# Patient Record
Sex: MALE | Race: BLACK OR AFRICAN AMERICAN | NOT HISPANIC OR LATINO | Employment: OTHER | URBAN - METROPOLITAN AREA
[De-identification: names, ages, dates, MRNs, and addresses within clinical notes are randomized per-mention and may not be internally consistent; named-entity substitution may affect disease eponyms.]

---

## 2017-11-16 ENCOUNTER — LAB VISIT (OUTPATIENT)
Dept: LAB | Facility: HOSPITAL | Age: 62
End: 2017-11-16
Attending: INTERNAL MEDICINE
Payer: MEDICARE

## 2017-11-16 DIAGNOSIS — E11.29 TYPE II DIABETES MELLITUS WITH RENAL MANIFESTATIONS: ICD-10-CM

## 2017-11-16 DIAGNOSIS — Z12.2 SCREENING FOR MALIGNANT NEOPLASM OF RESPIRATORY ORGAN: Primary | ICD-10-CM

## 2017-11-16 LAB
ALBUMIN SERPL BCP-MCNC: 4.3 G/DL
ALP SERPL-CCNC: 75 U/L
ALT SERPL W/O P-5'-P-CCNC: 205 U/L
ANION GAP SERPL CALC-SCNC: 11 MMOL/L
AST SERPL-CCNC: 400 U/L
BACTERIA #/AREA URNS AUTO: ABNORMAL /HPF
BASOPHILS # BLD AUTO: 0.02 K/UL
BASOPHILS NFR BLD: 0.3 %
BILIRUB SERPL-MCNC: 0.8 MG/DL
BILIRUB UR QL STRIP: NEGATIVE
BUN SERPL-MCNC: 12 MG/DL
CALCIUM SERPL-MCNC: 9.3 MG/DL
CHLORIDE SERPL-SCNC: 99 MMOL/L
CHOLEST SERPL-MCNC: 163 MG/DL
CHOLEST/HDLC SERPL: 2 {RATIO}
CLARITY UR REFRACT.AUTO: CLEAR
CO2 SERPL-SCNC: 28 MMOL/L
COLOR UR AUTO: ABNORMAL
CREAT SERPL-MCNC: 1.03 MG/DL
DIFFERENTIAL METHOD: ABNORMAL
EOSINOPHIL # BLD AUTO: 0.2 K/UL
EOSINOPHIL NFR BLD: 2.7 %
ERYTHROCYTE [DISTWIDTH] IN BLOOD BY AUTOMATED COUNT: 14.9 %
EST. GFR  (AFRICAN AMERICAN): >60 ML/MIN/1.73 M^2
EST. GFR  (NON AFRICAN AMERICAN): >60 ML/MIN/1.73 M^2
GLUCOSE SERPL-MCNC: 67 MG/DL
GLUCOSE UR QL STRIP: NEGATIVE
HCT VFR BLD AUTO: 42.7 %
HDLC SERPL-MCNC: 83 MG/DL
HDLC SERPL: 50.9 %
HGB BLD-MCNC: 14.5 G/DL
HGB UR QL STRIP: ABNORMAL
HYALINE CASTS UR QL AUTO: 0 /LPF
KETONES UR QL STRIP: NEGATIVE
LDLC SERPL CALC-MCNC: 66.8 MG/DL
LEUKOCYTE ESTERASE UR QL STRIP: ABNORMAL
LYMPHOCYTES # BLD AUTO: 3.3 K/UL
LYMPHOCYTES NFR BLD: 48 %
MCH RBC QN AUTO: 32 PG
MCHC RBC AUTO-ENTMCNC: 34 G/DL
MCV RBC AUTO: 94 FL
MICROSCOPIC COMMENT: ABNORMAL
MONOCYTES # BLD AUTO: 1.3 K/UL
MONOCYTES NFR BLD: 18.4 %
NEUTROPHILS # BLD AUTO: 2.1 K/UL
NEUTROPHILS NFR BLD: 30.5 %
NITRITE UR QL STRIP: NEGATIVE
NONHDLC SERPL-MCNC: 80 MG/DL
PH UR STRIP: 6 [PH] (ref 5–8)
PLATELET # BLD AUTO: 178 K/UL
PMV BLD AUTO: 10 FL
POTASSIUM SERPL-SCNC: 4.3 MMOL/L
PROT SERPL-MCNC: 7.7 G/DL
PROT UR QL STRIP: ABNORMAL
RBC # BLD AUTO: 4.53 M/UL
RBC #/AREA URNS AUTO: 2 /HPF (ref 0–4)
SODIUM SERPL-SCNC: 138 MMOL/L
SP GR UR STRIP: 1.02 (ref 1–1.03)
TRIGL SERPL-MCNC: 66 MG/DL
TSH SERPL DL<=0.005 MIU/L-ACNC: 1.77 UIU/ML
URN SPEC COLLECT METH UR: ABNORMAL
UROBILINOGEN UR STRIP-ACNC: 1 EU/DL
WBC # BLD AUTO: 6.96 K/UL
WBC #/AREA URNS AUTO: 20 /HPF (ref 0–5)

## 2017-11-16 PROCEDURE — 81000 URINALYSIS NONAUTO W/SCOPE: CPT | Mod: PO

## 2017-11-16 PROCEDURE — 85025 COMPLETE CBC W/AUTO DIFF WBC: CPT | Mod: PO

## 2017-11-16 PROCEDURE — 36415 COLL VENOUS BLD VENIPUNCTURE: CPT | Mod: PO

## 2017-11-16 PROCEDURE — 80053 COMPREHEN METABOLIC PANEL: CPT | Mod: PO

## 2017-11-16 PROCEDURE — 84443 ASSAY THYROID STIM HORMONE: CPT | Mod: PO

## 2017-11-16 PROCEDURE — 80061 LIPID PANEL: CPT

## 2017-11-16 PROCEDURE — 84153 ASSAY OF PSA TOTAL: CPT | Mod: PO

## 2017-11-17 ENCOUNTER — LAB VISIT (OUTPATIENT)
Dept: LAB | Facility: HOSPITAL | Age: 62
End: 2017-11-17
Attending: INTERNAL MEDICINE
Payer: MEDICARE

## 2017-11-17 DIAGNOSIS — Z12.2 SCREENING FOR MALIGNANT NEOPLASM OF RESPIRATORY ORGAN: Primary | ICD-10-CM

## 2017-11-17 LAB
OB PNL STL: NEGATIVE
PROSTATE SPECIFIC ANTIGEN, TOTAL: 66.6 NG/ML
PSA FREE MFR SERPL: 4.04 %
PSA FREE SERPL-MCNC: 2.69 NG/ML

## 2017-11-17 PROCEDURE — 82272 OCCULT BLD FECES 1-3 TESTS: CPT | Mod: PO

## 2017-11-22 ENCOUNTER — LAB VISIT (OUTPATIENT)
Dept: LAB | Facility: HOSPITAL | Age: 62
End: 2017-11-22
Attending: INTERNAL MEDICINE
Payer: MEDICARE

## 2017-11-22 DIAGNOSIS — R74.8 ACID PHOSPHATASE ELEVATED: Primary | ICD-10-CM

## 2017-11-22 LAB
AFP SERPL-MCNC: 1.9 NG/ML
GGT SERPL-CCNC: 83 U/L
HCV AB SERPL QL IA: NEGATIVE

## 2017-11-22 PROCEDURE — 82105 ALPHA-FETOPROTEIN SERUM: CPT | Mod: PO

## 2017-11-22 PROCEDURE — 86803 HEPATITIS C AB TEST: CPT | Mod: PO

## 2017-11-22 PROCEDURE — 82977 ASSAY OF GGT: CPT

## 2017-11-22 PROCEDURE — 36415 COLL VENOUS BLD VENIPUNCTURE: CPT | Mod: PO

## 2017-11-27 DIAGNOSIS — K70.10 ACUTE ALCOHOLIC HEPATITIS: Primary | ICD-10-CM

## 2017-12-01 ENCOUNTER — HOSPITAL ENCOUNTER (OUTPATIENT)
Dept: RADIOLOGY | Facility: HOSPITAL | Age: 62
Discharge: HOME | End: 2017-12-01
Attending: INTERNAL MEDICINE
Payer: MEDICARE

## 2017-12-01 DIAGNOSIS — K70.10 ACUTE ALCOHOLIC HEPATITIS: ICD-10-CM

## 2017-12-01 PROCEDURE — 76700 US EXAM ABDOM COMPLETE: CPT | Mod: TC,PO

## 2018-02-08 ENCOUNTER — OFFICE VISIT (OUTPATIENT)
Dept: GASTROENTEROLOGY | Facility: CLINIC | Age: 63
End: 2018-02-08
Payer: MEDICARE

## 2018-02-08 VITALS
SYSTOLIC BLOOD PRESSURE: 161 MMHG | BODY MASS INDEX: 21.37 KG/M2 | DIASTOLIC BLOOD PRESSURE: 97 MMHG | WEIGHT: 136.19 LBS | HEART RATE: 100 BPM | HEIGHT: 67 IN

## 2018-02-08 DIAGNOSIS — Z12.11 COLON CANCER SCREENING: ICD-10-CM

## 2018-02-08 DIAGNOSIS — R79.89 LFTS ABNORMAL: Primary | ICD-10-CM

## 2018-02-08 PROCEDURE — 99204 OFFICE O/P NEW MOD 45 MIN: CPT | Mod: S$GLB,,, | Performed by: INTERNAL MEDICINE

## 2018-02-08 PROCEDURE — 3008F BODY MASS INDEX DOCD: CPT | Mod: S$GLB,,, | Performed by: INTERNAL MEDICINE

## 2018-02-08 PROCEDURE — 99999 PR PBB SHADOW E&M-EST. PATIENT-LVL III: CPT | Mod: PBBFAC,,, | Performed by: INTERNAL MEDICINE

## 2018-02-08 NOTE — PROGRESS NOTES
Subjective:      Patient ID: Andrew Patrick is a 62 y.o. male.    Chief Complaint: Anorexia; Colonoscopy; and Elevated ALT    HPI:   Patient 62-year-old male referred with a history of abnormal liver enzymes.  Also due for colon screening.  Available labs documenting a is T of 400 and ALT of 205 in November 2017  Bilirubin and alkaline phosphatase were normal.  GGT was 83.  Hepatitis C antibody negative.  Patient admits to daily beer consumption.  May be minimizing quantity.  He is accompanied by his spouse who is fairly quiet on this point  GI systems review otherwise negative.  Past medical history includes hypertension.  Smokes cigarettes.  Family history negative for GI neoplasm.    Review of patient's allergies indicates:  No Known Allergies  History reviewed. No pertinent past medical history.  History reviewed. No pertinent surgical history.  Family History   Problem Relation Age of Onset    Diabetes Father      Social History     Social History    Marital status: Single     Spouse name: N/A    Number of children: N/A    Years of education: N/A     Occupational History    Not on file.     Social History Main Topics    Smoking status: Current Every Day Smoker    Smokeless tobacco: Current User    Alcohol use Yes    Drug use: Unknown    Sexual activity: Not on file     Other Topics Concern    Not on file     Social History Narrative    No narrative on file       Review of Systems:  Constitutional: Negative for appetite change.  Some weight loss  HENT: Negative for trouble swallowing.   Eyes: Negative for photophobia.   Respiratory: Negative for cough and shortness of breath.   Cardiovascular: Negative for palpitations.   Gastrointestinal: See HPI for details.  Genitourinary: Negative for frequency and hematuria.   Skin: Negative for rash.   Musculoskeletal: Joint pains.  Neurological: Negative for weakness and headaches.   Hematological: Negative.   Psychiatric/Behavioral: Negative for suicidal  "ideas and behavioral problems.     Objective:     BP (!) 161/97 (BP Location: Left arm, Patient Position: Sitting)   Pulse 100   Ht 5' 7" (1.702 m)   Wt 61.8 kg (136 lb 3.2 oz)   BMI 21.33 kg/m²     Physical Exam:  Eyes: Pupils are equal, round, and reactive to light.   Neck: Supple. No mass  Cardiovascular: Regular rhythm . No murmur   Pulmonary/Chest: Lungs clear   Abdominal: Soft. No mass palpated. Nontender, no guarding. Positive bowel sounds   Musculoskeletal: No deformity. No edema.   Psychiatric: Alert and oriented    Assessment:     1. LFTs abnormal    2. Colon cancer screening      Plan:     Andrew was seen today for anorexia, colonoscopy and elevated alt.    Diagnoses and all orders for this visit:    LFTs abnormal  -     Hepatitis B surface antigen; Future  -     Hepatitis B surface antibody; Future  -     Hepatic function panel; Future  -     Iron and TIBC; Future  -     Ferritin; Future    Colon cancer screening  -     Case request GI: COLONOSCOPY      Plan:  Recheck liver enzymes  Complete the viral hepatitis screen  Screening colonoscopy.  Advised patient to limit his alcohol intake.    CC Dr. doss    "

## 2018-02-08 NOTE — PATIENT INSTRUCTIONS
"  ALT  Does this test have other names?  Alanine aminotransferase, serum glutamic-pyruvic transaminase, SGPT  What is this test?  This test measures the amount of the enzyme alanine aminotransferase (ALT) in your blood.  ALT, formerly called SGPT, is mostly found in your liver cells. When liver cells are injured, they release this enzyme into your blood. High levels are a sign of liver damage.  This test is part of a group of tests commonly referred to as "liver function tests." Results of these tests give health care providers an overall picture of how well your liver is working.  Why do I need this test?  You may have this test to see if you have a liver disease, such as hepatitis. Symptoms of liver diseases include:  · Extreme tiredness  · Yellowing of the eyes and skin, a condition called jaundice  · Abdominal (belly) pain  · Nausea and vomiting  · Diarrhea  · Headache  You may also have this test to look for cirrhosis, which causes damage and scarring to the liver. Causes of cirrhosis include long-term hepatitis infection, excessive alcohol use, obesity, and exposure to certain medications or toxins. Symptoms of cirrhosis include:  · Abdominal swelling from fluid buildup  · Visible blood vessels in the skin  · Itchy skin  · Swelling of the legs, feet or ankles  · Nausea, loss of appetite  · Weight loss  · Fatigue or feeling tired  What other tests might I have along with this test?  Your health care provider may also order other tests of liver health, including:  · Albumin  · Bilirubin  · Alkaline phosphatase,  or  ALP  · Aspartate aminotransferase, or AST  · Prothrombin time, or  PT  Your health care provider may also order other tests that measure:  · Your liver's ability to process substances from your blood  · Levels of substances your liver produces  · Liver inflammation  What do my test results mean?  Many things may affect your lab test results. These include the method each lab uses to do the test. Even " if your test results are different from the normal value, you may not have a problem. To learn what the results mean for you, talk with your health care provider.  ALT levels are normally less than 40 international units per liter (IU/L). Levels above 1,000 IU/L may be a sign of:  · Acute viral hepatitis  · Lack of blood flow to the liver  · Injuries from drugs or toxins  The ratio of AST to ALT may also provide helpful information to your health care provider. AST levels are normally lower than ALT levels. AST is often higher than ALT in cases such as:  · Hepatitis from alcohol use  · Cirrhosis in people with long-term viral hepatitis  A number of other medical conditions besides liver disease can also cause liver enzymes to rise. These include:  · Muscle diseases  · Celiac disease  · Thyroid problems  · Adrenal gland problems  How is this test done?  The test requires a blood sample, which is drawn through a needle from a vein in your arm.  Does this test pose any risks?  Taking a blood sample with a needle carries risks that include bleeding, infection, bruising, or feeling dizzy. When the needle pricks your arm, you may feel a slight stinging sensation or pain. Afterward, the site may be slightly sore.  What might affect my test results?  Many medications can affect your test results, as can drinking alcohol.  How do I get ready for this test?  Your health care provider may ask you to not to eat or drink and avoid medications before your blood tests. Be sure your health care providers knows about all medicines, herbs, vitamins, and supplements you are taking. This includes medicines that don't need a prescription and any illicit drugs you may use.  © 4302-1431 [employer]. [address] [place] [address], [place] [address]. All rights reserved. This information is not intended as a substitute for professional medical care. Always follow your healthcare professional's instructions.

## 2018-02-09 ENCOUNTER — LAB VISIT (OUTPATIENT)
Dept: LAB | Facility: HOSPITAL | Age: 63
End: 2018-02-09
Attending: INTERNAL MEDICINE
Payer: MEDICARE

## 2018-02-09 DIAGNOSIS — R79.89 LFTS ABNORMAL: ICD-10-CM

## 2018-02-09 LAB
ALBUMIN SERPL BCP-MCNC: 4.4 G/DL
ALP SERPL-CCNC: 84 U/L
ALT SERPL W/O P-5'-P-CCNC: 20 U/L
AST SERPL-CCNC: 48 U/L
BILIRUB SERPL-MCNC: 0.4 MG/DL
FERRITIN SERPL-MCNC: 330 NG/ML
IRON SERPL-MCNC: 75 UG/DL
PROT SERPL-MCNC: 7.9 G/DL
SATURATED IRON: 22 %
TOTAL IRON BINDING CAPACITY: 346 UG/DL
TRANSFERRIN SERPL-MCNC: 234 MG/DL

## 2018-02-09 PROCEDURE — 86706 HEP B SURFACE ANTIBODY: CPT | Mod: PO

## 2018-02-09 PROCEDURE — 80076 HEPATIC FUNCTION PANEL: CPT | Mod: PO

## 2018-02-09 PROCEDURE — 83540 ASSAY OF IRON: CPT | Mod: PO

## 2018-02-09 PROCEDURE — 87340 HEPATITIS B SURFACE AG IA: CPT | Mod: PO

## 2018-02-09 PROCEDURE — 82728 ASSAY OF FERRITIN: CPT

## 2018-02-09 PROCEDURE — 36415 COLL VENOUS BLD VENIPUNCTURE: CPT | Mod: PO

## 2018-02-12 LAB
HBV SURFACE AB SER-ACNC: NEGATIVE M[IU]/ML
HBV SURFACE AG SERPL QL IA: NEGATIVE

## 2018-02-16 ENCOUNTER — TELEPHONE (OUTPATIENT)
Dept: GASTROENTEROLOGY | Facility: CLINIC | Age: 63
End: 2018-02-16

## 2018-02-16 NOTE — TELEPHONE ENCOUNTER
----- Message from Alma Rosa Patrick sent at 2/16/2018 11:00 AM CST -----  Contact: self/[phone]  Patient is returning your call.  Please call [phone] and advise.

## 2018-02-16 NOTE — TELEPHONE ENCOUNTER
----- Message from Luis Antonio Chavez Jr., MD sent at 2/15/2018  1:37 AM CST -----  Lab work shows improvement in the liver (probably due to his limiting his beer drinking)  Also tests negative for hepatitis B   Notify patient.

## 2018-03-09 ENCOUNTER — ANESTHESIA EVENT (OUTPATIENT)
Dept: ENDOSCOPY | Facility: HOSPITAL | Age: 63
End: 2018-03-09

## 2018-03-09 ENCOUNTER — ANESTHESIA (OUTPATIENT)
Dept: ENDOSCOPY | Facility: HOSPITAL | Age: 63
End: 2018-03-09

## 2021-04-26 ENCOUNTER — OFFICE VISIT (OUTPATIENT)
Dept: ENDOCRINOLOGY | Facility: CLINIC | Age: 66
End: 2021-04-26

## 2021-04-26 VITALS
BODY MASS INDEX: 24.48 KG/M2 | SYSTOLIC BLOOD PRESSURE: 100 MMHG | HEIGHT: 67 IN | TEMPERATURE: 98 F | DIASTOLIC BLOOD PRESSURE: 58 MMHG | OXYGEN SATURATION: 95 % | HEART RATE: 70 BPM | WEIGHT: 156 LBS

## 2021-04-26 DIAGNOSIS — E83.52 HYPERCALCEMIA: Primary | ICD-10-CM

## 2021-04-26 PROCEDURE — 99203 OFFICE O/P NEW LOW 30 MIN: CPT | Performed by: INTERNAL MEDICINE

## 2021-04-26 RX ORDER — CETIRIZINE HYDROCHLORIDE 10 MG/1
TABLET ORAL
COMMUNITY
Start: 2021-04-08

## 2021-04-26 RX ORDER — MELOXICAM 15 MG/1
15 TABLET ORAL DAILY
COMMUNITY
Start: 2021-04-07

## 2021-04-26 RX ORDER — CILOSTAZOL 100 MG/1
TABLET ORAL
COMMUNITY
Start: 2021-04-13

## 2021-04-26 RX ORDER — AMLODIPINE BESYLATE 5 MG/1
TABLET ORAL
COMMUNITY
Start: 2021-02-08

## 2021-04-26 RX ORDER — VARENICLINE TARTRATE 1 MG/1
TABLET, FILM COATED ORAL
COMMUNITY
Start: 2021-04-08 | End: 2021-04-26 | Stop reason: SDUPTHER

## 2021-04-26 RX ORDER — ATORVASTATIN CALCIUM 40 MG/1
TABLET, FILM COATED ORAL
COMMUNITY
Start: 2021-04-13

## 2021-05-11 ENCOUNTER — LAB (OUTPATIENT)
Dept: LAB | Facility: HOSPITAL | Age: 66
End: 2021-05-11

## 2021-05-11 DIAGNOSIS — E83.52 HYPERCALCEMIA: ICD-10-CM

## 2021-05-11 PROCEDURE — 81050 URINALYSIS VOLUME MEASURE: CPT

## 2021-05-11 PROCEDURE — 82340 ASSAY OF CALCIUM IN URINE: CPT

## 2022-09-02 ENCOUNTER — OUTSIDE PLACE OF SERVICE (OUTPATIENT)
Dept: CARDIOLOGY | Facility: CLINIC | Age: 67
End: 2022-09-02
Payer: MEDICAID

## 2022-09-02 PROCEDURE — 93010 ELECTROCARDIOGRAM REPORT: CPT | Mod: ,,, | Performed by: INTERNAL MEDICINE

## 2022-09-02 PROCEDURE — 93010 PR ELECTROCARDIOGRAM REPORT: ICD-10-PCS | Mod: ,,, | Performed by: INTERNAL MEDICINE

## 2022-09-19 ENCOUNTER — OFFICE VISIT (OUTPATIENT)
Dept: ENDOCRINOLOGY | Facility: CLINIC | Age: 67
End: 2022-09-19

## 2022-09-19 ENCOUNTER — LAB (OUTPATIENT)
Dept: LAB | Facility: HOSPITAL | Age: 67
End: 2022-09-19

## 2022-09-19 VITALS
DIASTOLIC BLOOD PRESSURE: 70 MMHG | WEIGHT: 146.4 LBS | SYSTOLIC BLOOD PRESSURE: 120 MMHG | BODY MASS INDEX: 22.98 KG/M2 | HEART RATE: 65 BPM | OXYGEN SATURATION: 100 % | HEIGHT: 67 IN

## 2022-09-19 DIAGNOSIS — E83.52 HYPERCALCEMIA: Primary | ICD-10-CM

## 2022-09-19 LAB
ALBUMIN SERPL-MCNC: 4.7 G/DL (ref 3.5–5.2)
ALBUMIN/GLOB SERPL: 1.7 G/DL
ALP SERPL-CCNC: 131 U/L (ref 39–117)
ALT SERPL W P-5'-P-CCNC: 24 U/L (ref 1–41)
ANION GAP SERPL CALCULATED.3IONS-SCNC: 10 MMOL/L (ref 5–15)
AST SERPL-CCNC: 35 U/L (ref 1–40)
BILIRUB SERPL-MCNC: 0.4 MG/DL (ref 0–1.2)
BUN SERPL-MCNC: 6 MG/DL (ref 8–23)
BUN/CREAT SERPL: 6.3 (ref 7–25)
CALCIUM SPEC-SCNC: 10.9 MG/DL (ref 8.6–10.5)
CHLORIDE SERPL-SCNC: 105 MMOL/L (ref 98–107)
CO2 SERPL-SCNC: 22 MMOL/L (ref 22–29)
CREAT SERPL-MCNC: 0.95 MG/DL (ref 0.76–1.27)
EGFRCR SERPLBLD CKD-EPI 2021: 88.8 ML/MIN/1.73
GLOBULIN UR ELPH-MCNC: 2.7 GM/DL
GLUCOSE SERPL-MCNC: 98 MG/DL (ref 65–99)
POTASSIUM SERPL-SCNC: 4 MMOL/L (ref 3.5–5.2)
PROT SERPL-MCNC: 7.4 G/DL (ref 6–8.5)
PTH-INTACT SERPL-MCNC: 38.5 PG/ML (ref 15–65)
SODIUM SERPL-SCNC: 137 MMOL/L (ref 136–145)

## 2022-09-19 PROCEDURE — 80053 COMPREHEN METABOLIC PANEL: CPT | Performed by: INTERNAL MEDICINE

## 2022-09-19 PROCEDURE — 99213 OFFICE O/P EST LOW 20 MIN: CPT | Performed by: INTERNAL MEDICINE

## 2022-09-19 PROCEDURE — 83970 ASSAY OF PARATHORMONE: CPT | Performed by: INTERNAL MEDICINE

## 2022-09-19 RX ORDER — OMEPRAZOLE 40 MG/1
40 CAPSULE, DELAYED RELEASE ORAL AS NEEDED
COMMUNITY
Start: 2022-07-05

## 2022-09-19 RX ORDER — CYCLOBENZAPRINE HCL 10 MG
10 TABLET ORAL AS NEEDED
COMMUNITY
Start: 2022-08-31

## 2023-09-06 ENCOUNTER — TELEPHONE (OUTPATIENT)
Dept: FAMILY MEDICINE | Facility: CLINIC | Age: 68
End: 2023-09-06
Payer: MEDICAID

## 2023-09-06 DIAGNOSIS — R94.2 ABNORMAL PET SCAN OF LUNG: ICD-10-CM

## 2023-09-06 DIAGNOSIS — C61 PROSTATE CANCER: ICD-10-CM

## 2023-09-06 DIAGNOSIS — R93.7 ABNORMAL CT SCAN, CERVICAL SPINE: Primary | ICD-10-CM

## 2023-09-07 NOTE — TELEPHONE ENCOUNTER
Spoke with Dr. Kemi cartwright at your C-suggested MRI of cervical spine because of possible fracture instability seen on PET scan.  Does not look like metastatic disease of the area.      Positive PET scan pulmonary lesions.    Discussed with the 10 at night-will fax to [facility] for follow-up with pulmonology oncology and MRI of cervical spine

## 2023-12-01 ENCOUNTER — APPOINTMENT (OUTPATIENT)
Dept: CT IMAGING | Facility: HOSPITAL | Age: 68
End: 2023-12-01
Payer: MEDICARE

## 2023-12-01 ENCOUNTER — HOSPITAL ENCOUNTER (EMERGENCY)
Facility: HOSPITAL | Age: 68
Discharge: HOME | End: 2023-12-01
Attending: STUDENT IN AN ORGANIZED HEALTH CARE EDUCATION/TRAINING PROGRAM
Payer: MEDICARE

## 2023-12-01 VITALS
HEIGHT: 67 IN | WEIGHT: 132 LBS | DIASTOLIC BLOOD PRESSURE: 66 MMHG | BODY MASS INDEX: 20.72 KG/M2 | HEART RATE: 59 BPM | RESPIRATION RATE: 14 BRPM | OXYGEN SATURATION: 98 % | TEMPERATURE: 98.5 F | SYSTOLIC BLOOD PRESSURE: 115 MMHG

## 2023-12-01 DIAGNOSIS — R07.9 CHEST PAIN, UNSPECIFIED TYPE: Primary | ICD-10-CM

## 2023-12-01 DIAGNOSIS — R10.9 LEFT LATERAL ABDOMINAL PAIN: ICD-10-CM

## 2023-12-01 LAB
ALBUMIN SERPL-MCNC: 4.2 G/DL (ref 3.5–5.2)
ALBUMIN/GLOB SERPL: 1.3 G/DL
ALP SERPL-CCNC: 126 U/L (ref 39–117)
ALT SERPL W P-5'-P-CCNC: 11 U/L (ref 1–41)
ANION GAP SERPL CALCULATED.3IONS-SCNC: 11 MMOL/L (ref 5–15)
AST SERPL-CCNC: 12 U/L (ref 1–40)
BACTERIA UR QL AUTO: ABNORMAL /HPF
BASOPHILS # BLD AUTO: 0.07 10*3/MM3 (ref 0–0.2)
BASOPHILS NFR BLD AUTO: 0.6 % (ref 0–1.5)
BILIRUB SERPL-MCNC: 0.3 MG/DL (ref 0–1.2)
BILIRUB UR QL STRIP: NEGATIVE
BUN SERPL-MCNC: 8 MG/DL (ref 8–23)
BUN/CREAT SERPL: 9.3 (ref 7–25)
CALCIUM SPEC-SCNC: 10.5 MG/DL (ref 8.6–10.5)
CHLORIDE SERPL-SCNC: 102 MMOL/L (ref 98–107)
CLARITY UR: ABNORMAL
CO2 SERPL-SCNC: 26 MMOL/L (ref 22–29)
COLOR UR: YELLOW
CREAT SERPL-MCNC: 0.86 MG/DL (ref 0.76–1.27)
D-LACTATE SERPL-SCNC: 1.3 MMOL/L (ref 0.5–2)
DEPRECATED RDW RBC AUTO: 43.1 FL (ref 37–54)
EGFRCR SERPLBLD CKD-EPI 2021: 94.9 ML/MIN/1.73
EOSINOPHIL # BLD AUTO: 0.42 10*3/MM3 (ref 0–0.4)
EOSINOPHIL NFR BLD AUTO: 3.3 % (ref 0.3–6.2)
ERYTHROCYTE [DISTWIDTH] IN BLOOD BY AUTOMATED COUNT: 12.9 % (ref 12.3–15.4)
GEN 5 2HR TROPONIN T REFLEX: 9 NG/L
GLOBULIN UR ELPH-MCNC: 3.3 GM/DL
GLUCOSE SERPL-MCNC: 98 MG/DL (ref 65–99)
GLUCOSE UR STRIP-MCNC: NEGATIVE MG/DL
HCT VFR BLD AUTO: 39.9 % (ref 37.5–51)
HGB BLD-MCNC: 13.4 G/DL (ref 13–17.7)
HGB UR QL STRIP.AUTO: NEGATIVE
HYALINE CASTS UR QL AUTO: ABNORMAL /LPF
IMM GRANULOCYTES # BLD AUTO: 0.02 10*3/MM3 (ref 0–0.05)
IMM GRANULOCYTES NFR BLD AUTO: 0.2 % (ref 0–0.5)
KETONES UR QL STRIP: ABNORMAL
LEUKOCYTE ESTERASE UR QL STRIP.AUTO: ABNORMAL
LIPASE SERPL-CCNC: 18 U/L (ref 13–60)
LYMPHOCYTES # BLD AUTO: 4.45 10*3/MM3 (ref 0.7–3.1)
LYMPHOCYTES NFR BLD AUTO: 35.1 % (ref 19.6–45.3)
MCH RBC QN AUTO: 30.7 PG (ref 26.6–33)
MCHC RBC AUTO-ENTMCNC: 33.6 G/DL (ref 31.5–35.7)
MCV RBC AUTO: 91.5 FL (ref 79–97)
MONOCYTES # BLD AUTO: 0.87 10*3/MM3 (ref 0.1–0.9)
MONOCYTES NFR BLD AUTO: 6.9 % (ref 5–12)
NEUTROPHILS NFR BLD AUTO: 53.9 % (ref 42.7–76)
NEUTROPHILS NFR BLD AUTO: 6.85 10*3/MM3 (ref 1.7–7)
NITRITE UR QL STRIP: NEGATIVE
NRBC BLD AUTO-RTO: 0 /100 WBC (ref 0–0.2)
PH UR STRIP.AUTO: 7 [PH] (ref 5–8)
PLATELET # BLD AUTO: 279 10*3/MM3 (ref 140–450)
PMV BLD AUTO: 9.9 FL (ref 6–12)
POTASSIUM SERPL-SCNC: 3.6 MMOL/L (ref 3.5–5.2)
PROCALCITONIN SERPL-MCNC: 0.04 NG/ML (ref 0–0.25)
PROT SERPL-MCNC: 7.5 G/DL (ref 6–8.5)
PROT UR QL STRIP: NEGATIVE
RBC # BLD AUTO: 4.36 10*6/MM3 (ref 4.14–5.8)
RBC # UR STRIP: ABNORMAL /HPF
REF LAB TEST METHOD: ABNORMAL
SODIUM SERPL-SCNC: 139 MMOL/L (ref 136–145)
SP GR UR STRIP: 1.02 (ref 1–1.03)
SQUAMOUS #/AREA URNS HPF: ABNORMAL /HPF
TROPONIN T DELTA: NORMAL
TROPONIN T SERPL HS-MCNC: <6 NG/L
UROBILINOGEN UR QL STRIP: ABNORMAL
WBC # UR STRIP: ABNORMAL /HPF
WBC NRBC COR # BLD AUTO: 12.68 10*3/MM3 (ref 3.4–10.8)

## 2023-12-01 PROCEDURE — 83690 ASSAY OF LIPASE: CPT | Performed by: PHYSICIAN ASSISTANT

## 2023-12-01 PROCEDURE — 93005 ELECTROCARDIOGRAM TRACING: CPT | Performed by: PHYSICIAN ASSISTANT

## 2023-12-01 PROCEDURE — 74176 CT ABD & PELVIS W/O CONTRAST: CPT

## 2023-12-01 PROCEDURE — 81001 URINALYSIS AUTO W/SCOPE: CPT | Performed by: PHYSICIAN ASSISTANT

## 2023-12-01 PROCEDURE — 84484 ASSAY OF TROPONIN QUANT: CPT | Performed by: PHYSICIAN ASSISTANT

## 2023-12-01 PROCEDURE — 25810000003 SODIUM CHLORIDE 0.9 % SOLUTION: Performed by: PHYSICIAN ASSISTANT

## 2023-12-01 PROCEDURE — 80053 COMPREHEN METABOLIC PANEL: CPT | Performed by: PHYSICIAN ASSISTANT

## 2023-12-01 PROCEDURE — 36415 COLL VENOUS BLD VENIPUNCTURE: CPT

## 2023-12-01 PROCEDURE — 84145 PROCALCITONIN (PCT): CPT | Performed by: PHYSICIAN ASSISTANT

## 2023-12-01 PROCEDURE — 25010000002 KETOROLAC TROMETHAMINE PER 15 MG: Performed by: PHYSICIAN ASSISTANT

## 2023-12-01 PROCEDURE — 83605 ASSAY OF LACTIC ACID: CPT | Performed by: PHYSICIAN ASSISTANT

## 2023-12-01 PROCEDURE — 99284 EMERGENCY DEPT VISIT MOD MDM: CPT

## 2023-12-01 PROCEDURE — 85025 COMPLETE CBC W/AUTO DIFF WBC: CPT | Performed by: PHYSICIAN ASSISTANT

## 2023-12-01 PROCEDURE — 96374 THER/PROPH/DIAG INJ IV PUSH: CPT

## 2023-12-01 RX ORDER — BUTALBITAL/ACETAMINOPHEN 50MG-325MG
1 TABLET ORAL AS NEEDED
COMMUNITY

## 2023-12-01 RX ORDER — SUCRALFATE 1 G/1
1 TABLET ORAL 4 TIMES DAILY
COMMUNITY

## 2023-12-01 RX ORDER — ASPIRIN 81 MG/1
81 TABLET, CHEWABLE ORAL DAILY
COMMUNITY

## 2023-12-01 RX ORDER — KETOROLAC TROMETHAMINE 30 MG/ML
15 INJECTION, SOLUTION INTRAMUSCULAR; INTRAVENOUS ONCE
Status: COMPLETED | OUTPATIENT
Start: 2023-12-01 | End: 2023-12-01

## 2023-12-01 RX ORDER — FAMOTIDINE 40 MG/1
40 TABLET, FILM COATED ORAL DAILY
COMMUNITY

## 2023-12-01 RX ADMIN — KETOROLAC TROMETHAMINE 15 MG: 30 INJECTION, SOLUTION INTRAMUSCULAR at 20:32

## 2023-12-01 RX ADMIN — SODIUM CHLORIDE 1000 ML: 9 INJECTION, SOLUTION INTRAVENOUS at 14:51

## 2023-12-02 LAB
QT INTERVAL: 394 MS
QT INTERVAL: 434 MS
QTC INTERVAL: 394 MS
QTC INTERVAL: 407 MS

## 2024-02-08 ENCOUNTER — TELEPHONE (OUTPATIENT)
Dept: HEMATOLOGY/ONCOLOGY | Facility: CLINIC | Age: 69
End: 2024-02-08
Payer: MEDICAID

## 2024-02-08 NOTE — TELEPHONE ENCOUNTER
Oncology nurse navigator returned call to [facility], no answer when transferred to [facility] who called earlier and no voicemail for message to be left.

## 2024-02-08 NOTE — TELEPHONE ENCOUNTER
----- Message from Corinne E Coniglio, RN sent at 2/8/2024  1:40 PM CST -----    ----- Message -----  From: Vale Bueno  Sent: 2/8/2024   1:17 PM CST  To: [facility]      ----- Message -----  From: Tati Ac  Sent: 2/8/2024   9:31 AM CST  To: [facility]emon[facility]  Pool    Type:  Patient Returning Call    Who Called:Juliana with [facility]     Does the patient know what this is regarding?:Referral   Would the patient rather a call back or a response via [facility]? Call   Best Call Back Number:  Additional Information:      Juliana would like to know if the referral for pt was received